# Patient Record
Sex: FEMALE | Race: BLACK OR AFRICAN AMERICAN | NOT HISPANIC OR LATINO
[De-identification: names, ages, dates, MRNs, and addresses within clinical notes are randomized per-mention and may not be internally consistent; named-entity substitution may affect disease eponyms.]

---

## 2017-01-05 ENCOUNTER — APPOINTMENT (OUTPATIENT)
Dept: BARIATRICS | Facility: CLINIC | Age: 47
End: 2017-01-05

## 2017-01-09 ENCOUNTER — APPOINTMENT (OUTPATIENT)
Dept: OTOLARYNGOLOGY | Facility: CLINIC | Age: 47
End: 2017-01-09

## 2017-01-30 ENCOUNTER — APPOINTMENT (OUTPATIENT)
Dept: OTOLARYNGOLOGY | Facility: CLINIC | Age: 47
End: 2017-01-30

## 2017-01-30 VITALS — DIASTOLIC BLOOD PRESSURE: 80 MMHG | HEART RATE: 78 BPM | OXYGEN SATURATION: 98 % | SYSTOLIC BLOOD PRESSURE: 122 MMHG

## 2017-01-30 DIAGNOSIS — G47.19 OTHER HYPERSOMNIA: ICD-10-CM

## 2017-02-13 ENCOUNTER — APPOINTMENT (OUTPATIENT)
Dept: PULMONOLOGY | Facility: CLINIC | Age: 47
End: 2017-02-13

## 2017-02-27 ENCOUNTER — APPOINTMENT (OUTPATIENT)
Dept: BARIATRICS | Facility: CLINIC | Age: 47
End: 2017-02-27

## 2017-02-27 VITALS — WEIGHT: 270 LBS | HEIGHT: 65 IN | BODY MASS INDEX: 44.98 KG/M2

## 2017-03-06 ENCOUNTER — APPOINTMENT (OUTPATIENT)
Dept: PULMONOLOGY | Facility: CLINIC | Age: 47
End: 2017-03-06

## 2017-03-24 ENCOUNTER — APPOINTMENT (OUTPATIENT)
Dept: PULMONOLOGY | Facility: CLINIC | Age: 47
End: 2017-03-24

## 2017-04-06 ENCOUNTER — APPOINTMENT (OUTPATIENT)
Dept: BARIATRICS | Facility: CLINIC | Age: 47
End: 2017-04-06

## 2017-04-06 VITALS
BODY MASS INDEX: 45.32 KG/M2 | OXYGEN SATURATION: 98 % | TEMPERATURE: 97.8 F | SYSTOLIC BLOOD PRESSURE: 124 MMHG | HEIGHT: 65 IN | WEIGHT: 272 LBS | HEART RATE: 80 BPM | DIASTOLIC BLOOD PRESSURE: 84 MMHG

## 2017-05-02 RX ORDER — SCOPALAMINE 1 MG/3D
1.5 PATCH, EXTENDED RELEASE TRANSDERMAL ONCE
Qty: 0 | Refills: 0 | Status: COMPLETED | OUTPATIENT
Start: 2017-05-08 | End: 2017-05-08

## 2017-05-05 VITALS
DIASTOLIC BLOOD PRESSURE: 63 MMHG | TEMPERATURE: 98 F | WEIGHT: 270.07 LBS | RESPIRATION RATE: 16 BRPM | SYSTOLIC BLOOD PRESSURE: 125 MMHG | HEIGHT: 65 IN | OXYGEN SATURATION: 99 % | HEART RATE: 72 BPM

## 2017-05-05 NOTE — PATIENT PROFILE ADULT. - PMH
Arthritis  psoariatic arthritis  Cerebral infarct    DM (diabetes mellitus)    HLP (hyperkeratosis lenticularis perstans)    HTN (hypertension)    TIA (transient ischemic attack)

## 2017-05-08 ENCOUNTER — APPOINTMENT (OUTPATIENT)
Dept: BARIATRICS | Facility: HOSPITAL | Age: 47
End: 2017-05-08

## 2017-05-08 ENCOUNTER — INPATIENT (INPATIENT)
Facility: HOSPITAL | Age: 47
LOS: 1 days | Discharge: HOME | DRG: 621 | End: 2017-05-10
Attending: SURGERY | Admitting: SURGERY
Payer: COMMERCIAL

## 2017-05-08 ENCOUNTER — RESULT REVIEW (OUTPATIENT)
Age: 47
End: 2017-05-08

## 2017-05-08 DIAGNOSIS — Z98.890 OTHER SPECIFIED POSTPROCEDURAL STATES: Chronic | ICD-10-CM

## 2017-05-08 DIAGNOSIS — Z90.710 ACQUIRED ABSENCE OF BOTH CERVIX AND UTERUS: Chronic | ICD-10-CM

## 2017-05-08 LAB
HCT VFR BLD CALC: 35 % — SIGNIFICANT CHANGE UP (ref 34.5–45)
HGB BLD-MCNC: 11.9 G/DL — SIGNIFICANT CHANGE UP (ref 11.5–15.5)
MCHC RBC-ENTMCNC: 30.9 PG — SIGNIFICANT CHANGE UP (ref 27–34)
MCHC RBC-ENTMCNC: 34 G/DL — SIGNIFICANT CHANGE UP (ref 32–36)
MCV RBC AUTO: 90.9 FL — SIGNIFICANT CHANGE UP (ref 80–100)
PLATELET # BLD AUTO: 401 K/UL — HIGH (ref 150–400)
RBC # BLD: 3.85 M/UL — SIGNIFICANT CHANGE UP (ref 3.8–5.2)
RBC # FLD: 13.6 % — SIGNIFICANT CHANGE UP (ref 10.3–16.9)
WBC # BLD: 12.5 K/UL — HIGH (ref 3.8–10.5)
WBC # FLD AUTO: 12.5 K/UL — HIGH (ref 3.8–10.5)

## 2017-05-08 PROCEDURE — 43775 LAP SLEEVE GASTRECTOMY: CPT | Mod: GC

## 2017-05-08 PROCEDURE — 43332 TRANSAB ESOPH HIAT HERN RPR: CPT | Mod: GC

## 2017-05-08 RX ORDER — PANTOPRAZOLE SODIUM 20 MG/1
40 TABLET, DELAYED RELEASE ORAL DAILY
Qty: 0 | Refills: 0 | Status: DISCONTINUED | OUTPATIENT
Start: 2017-05-08 | End: 2017-05-09

## 2017-05-08 RX ORDER — SODIUM CHLORIDE 9 MG/ML
1000 INJECTION, SOLUTION INTRAVENOUS
Qty: 0 | Refills: 0 | Status: DISCONTINUED | OUTPATIENT
Start: 2017-05-08 | End: 2017-05-10

## 2017-05-08 RX ORDER — HYDROMORPHONE HYDROCHLORIDE 2 MG/ML
0.5 INJECTION INTRAMUSCULAR; INTRAVENOUS; SUBCUTANEOUS
Qty: 0 | Refills: 0 | Status: DISCONTINUED | OUTPATIENT
Start: 2017-05-08 | End: 2017-05-08

## 2017-05-08 RX ORDER — LOSARTAN POTASSIUM 100 MG/1
1 TABLET, FILM COATED ORAL
Qty: 0 | Refills: 0 | COMMUNITY

## 2017-05-08 RX ORDER — FOLIC ACID 0.8 MG
1 TABLET ORAL
Qty: 0 | Refills: 0 | COMMUNITY

## 2017-05-08 RX ORDER — INDOMETHACIN 50 MG
1 CAPSULE ORAL
Qty: 0 | Refills: 0 | COMMUNITY

## 2017-05-08 RX ORDER — DEXTROSE 50 % IN WATER 50 %
25 SYRINGE (ML) INTRAVENOUS ONCE
Qty: 0 | Refills: 0 | Status: DISCONTINUED | OUTPATIENT
Start: 2017-05-08 | End: 2017-05-10

## 2017-05-08 RX ORDER — SIMVASTATIN 20 MG/1
1 TABLET, FILM COATED ORAL
Qty: 0 | Refills: 0 | COMMUNITY

## 2017-05-08 RX ORDER — METHOTREXATE 2.5 MG/1
1 TABLET ORAL
Qty: 0 | Refills: 0 | COMMUNITY

## 2017-05-08 RX ORDER — SIMETHICONE 80 MG/1
80 TABLET, CHEWABLE ORAL EVERY 4 HOURS
Qty: 0 | Refills: 0 | Status: DISCONTINUED | OUTPATIENT
Start: 2017-05-08 | End: 2017-05-10

## 2017-05-08 RX ORDER — GLUCAGON INJECTION, SOLUTION 0.5 MG/.1ML
1 INJECTION, SOLUTION SUBCUTANEOUS ONCE
Qty: 0 | Refills: 0 | Status: DISCONTINUED | OUTPATIENT
Start: 2017-05-08 | End: 2017-05-10

## 2017-05-08 RX ORDER — ENOXAPARIN SODIUM 100 MG/ML
40 INJECTION SUBCUTANEOUS ONCE
Qty: 0 | Refills: 0 | Status: COMPLETED | OUTPATIENT
Start: 2017-05-08 | End: 2017-05-08

## 2017-05-08 RX ORDER — DEXTROSE 50 % IN WATER 50 %
12.5 SYRINGE (ML) INTRAVENOUS ONCE
Qty: 0 | Refills: 0 | Status: DISCONTINUED | OUTPATIENT
Start: 2017-05-08 | End: 2017-05-10

## 2017-05-08 RX ORDER — HYDROMORPHONE HYDROCHLORIDE 2 MG/ML
1 INJECTION INTRAMUSCULAR; INTRAVENOUS; SUBCUTANEOUS EVERY 4 HOURS
Qty: 0 | Refills: 0 | Status: DISCONTINUED | OUTPATIENT
Start: 2017-05-08 | End: 2017-05-09

## 2017-05-08 RX ORDER — HYDROMORPHONE HYDROCHLORIDE 2 MG/ML
0.5 INJECTION INTRAMUSCULAR; INTRAVENOUS; SUBCUTANEOUS EVERY 4 HOURS
Qty: 0 | Refills: 0 | Status: DISCONTINUED | OUTPATIENT
Start: 2017-05-08 | End: 2017-05-09

## 2017-05-08 RX ORDER — HYDROMORPHONE HYDROCHLORIDE 2 MG/ML
0.5 INJECTION INTRAMUSCULAR; INTRAVENOUS; SUBCUTANEOUS
Qty: 0 | Refills: 0 | Status: DISCONTINUED | OUTPATIENT
Start: 2017-05-08 | End: 2017-05-09

## 2017-05-08 RX ORDER — HYDROMORPHONE HYDROCHLORIDE 2 MG/ML
0.5 INJECTION INTRAMUSCULAR; INTRAVENOUS; SUBCUTANEOUS EVERY 4 HOURS
Qty: 0 | Refills: 0 | Status: DISCONTINUED | OUTPATIENT
Start: 2017-05-08 | End: 2017-05-08

## 2017-05-08 RX ORDER — ONDANSETRON 8 MG/1
4 TABLET, FILM COATED ORAL EVERY 6 HOURS
Qty: 0 | Refills: 0 | Status: DISCONTINUED | OUTPATIENT
Start: 2017-05-08 | End: 2017-05-10

## 2017-05-08 RX ORDER — PANTOPRAZOLE SODIUM 20 MG/1
40 TABLET, DELAYED RELEASE ORAL DAILY
Qty: 0 | Refills: 0 | Status: DISCONTINUED | OUTPATIENT
Start: 2017-05-08 | End: 2017-05-08

## 2017-05-08 RX ORDER — HYDROMORPHONE HYDROCHLORIDE 2 MG/ML
1 INJECTION INTRAMUSCULAR; INTRAVENOUS; SUBCUTANEOUS EVERY 4 HOURS
Qty: 0 | Refills: 0 | Status: DISCONTINUED | OUTPATIENT
Start: 2017-05-08 | End: 2017-05-08

## 2017-05-08 RX ORDER — INSULIN LISPRO 100/ML
VIAL (ML) SUBCUTANEOUS
Qty: 0 | Refills: 0 | Status: DISCONTINUED | OUTPATIENT
Start: 2017-05-08 | End: 2017-05-10

## 2017-05-08 RX ORDER — METOCLOPRAMIDE HCL 10 MG
10 TABLET ORAL EVERY 8 HOURS
Qty: 0 | Refills: 0 | Status: DISCONTINUED | OUTPATIENT
Start: 2017-05-08 | End: 2017-05-10

## 2017-05-08 RX ORDER — SCOPALAMINE 1 MG/3D
1.5 PATCH, EXTENDED RELEASE TRANSDERMAL ONCE
Qty: 0 | Refills: 0 | Status: COMPLETED | OUTPATIENT
Start: 2017-05-08 | End: 2017-05-08

## 2017-05-08 RX ORDER — DEXTROSE 50 % IN WATER 50 %
1 SYRINGE (ML) INTRAVENOUS ONCE
Qty: 0 | Refills: 0 | Status: DISCONTINUED | OUTPATIENT
Start: 2017-05-08 | End: 2017-05-10

## 2017-05-08 RX ORDER — ASPIRIN/CALCIUM CARB/MAGNESIUM 324 MG
1 TABLET ORAL
Qty: 0 | Refills: 0 | COMMUNITY

## 2017-05-08 RX ADMIN — HYDROMORPHONE HYDROCHLORIDE 1 MILLIGRAM(S): 2 INJECTION INTRAMUSCULAR; INTRAVENOUS; SUBCUTANEOUS at 19:55

## 2017-05-08 RX ADMIN — ENOXAPARIN SODIUM 40 MILLIGRAM(S): 100 INJECTION SUBCUTANEOUS at 07:31

## 2017-05-08 RX ADMIN — SIMETHICONE 80 MILLIGRAM(S): 80 TABLET, CHEWABLE ORAL at 23:31

## 2017-05-08 RX ADMIN — HYDROMORPHONE HYDROCHLORIDE 0.5 MILLIGRAM(S): 2 INJECTION INTRAMUSCULAR; INTRAVENOUS; SUBCUTANEOUS at 14:04

## 2017-05-08 RX ADMIN — Medication: at 11:10

## 2017-05-08 RX ADMIN — HYDROMORPHONE HYDROCHLORIDE 0.5 MILLIGRAM(S): 2 INJECTION INTRAMUSCULAR; INTRAVENOUS; SUBCUTANEOUS at 23:32

## 2017-05-08 RX ADMIN — HYDROMORPHONE HYDROCHLORIDE 0.5 MILLIGRAM(S): 2 INJECTION INTRAMUSCULAR; INTRAVENOUS; SUBCUTANEOUS at 23:57

## 2017-05-08 RX ADMIN — HYDROMORPHONE HYDROCHLORIDE 0.5 MILLIGRAM(S): 2 INJECTION INTRAMUSCULAR; INTRAVENOUS; SUBCUTANEOUS at 11:40

## 2017-05-08 RX ADMIN — HYDROMORPHONE HYDROCHLORIDE 0.5 MILLIGRAM(S): 2 INJECTION INTRAMUSCULAR; INTRAVENOUS; SUBCUTANEOUS at 11:06

## 2017-05-08 RX ADMIN — HYDROMORPHONE HYDROCHLORIDE 0.5 MILLIGRAM(S): 2 INJECTION INTRAMUSCULAR; INTRAVENOUS; SUBCUTANEOUS at 12:11

## 2017-05-08 RX ADMIN — HYDROMORPHONE HYDROCHLORIDE 1 MILLIGRAM(S): 2 INJECTION INTRAMUSCULAR; INTRAVENOUS; SUBCUTANEOUS at 19:33

## 2017-05-08 RX ADMIN — SCOPALAMINE 1.5 MILLIGRAM(S): 1 PATCH, EXTENDED RELEASE TRANSDERMAL at 07:31

## 2017-05-08 RX ADMIN — PANTOPRAZOLE SODIUM 40 MILLIGRAM(S): 20 TABLET, DELAYED RELEASE ORAL at 13:43

## 2017-05-08 RX ADMIN — ONDANSETRON 4 MILLIGRAM(S): 8 TABLET, FILM COATED ORAL at 19:32

## 2017-05-08 NOTE — H&P ADULT - FAMILY HISTORY
<<-----Click on this checkbox to enter Family History Family history of multiple sclerosis     Father  Still living? Unknown  Family history of diabetes mellitus, Age at diagnosis: Age Unknown  Family history of hypertension, Age at diagnosis: Age Unknown     Mother  Still living? Unknown  Family history of diabetes mellitus, Age at diagnosis: Age Unknown  Family history of hypertension, Age at diagnosis: Age Unknown

## 2017-05-08 NOTE — CHART NOTE - NSCHARTNOTEFT_GEN_A_CORE
Procedure: lap sleeve gastrectomy + hiatal hernia repair   Surgeon: Dr Bender    S: Pt has no complaints. Denies CP, SOB, CORTÉS, calf tenderness. Pain controlled with medication.    O:  T(C): 36.7, Max: 36.7 (05-08 @ 12:00)  T(F): 98, Max: 98 (05-08 @ 12:00)  HR: 61 (54 - 68)  BP: 161/72 (120/67 - 164/77)  RR: 16 (14 - 18)  SpO2: 100% (96% - 100%)  Wt(kg): --            Gen: NAD, resting comfortably in bed  C/V: NSR  Pulm: Nonlabored breathing, no respiratory distress  Abd: obese, soft, appropriately tender.  Incision: lap sites CDI  Extrem: WWP, no calf edema, SCDs in place      A/P: 46yFemale s/p above procedure  Diet: BCLD  IVF: 150  Pain/nausea control  DVT ppx  CBC 4pm

## 2017-05-08 NOTE — PACU DISCHARGE NOTE - COMMENTS
pt aao x3.  VSS.  lap sites with dermabond.  report given to YUNIER dinh on 9 uris.  pt to go to [id number] via bed with nursing attendants

## 2017-05-08 NOTE — H&P ADULT - HISTORY OF PRESENT ILLNESS
46F w/ morbid obesity (BMI 45.2), HTN, HLD, HTG, T2DM, arthritis, lower back pain, and h/o stroke who presents for LSG. Unable to lose weight by other means and continues gaining weight.

## 2017-05-08 NOTE — BRIEF OPERATIVE NOTE - PROCEDURE
Laparoscopic sleeve gastrectomy with laparoscopic repair of hiatal hernia  05/08/2017    Active  BBORDEN

## 2017-05-08 NOTE — BRIEF OPERATIVE NOTE - OPERATION/FINDINGS
Stomach stapled along [place]ie using Endopath [place] staple loads- 1 gold, 2 green, 3 blue. Small hiatal hernia closed. Umbilical port site closed via endoclose. Hemostasis achieved at end of case. Port sites closed primarily.

## 2017-05-08 NOTE — H&P ADULT - NSHPPHYSICALEXAM_GEN_ALL_CORE
Pre-Op Clinic Visit Physical Exam:   Constitutional: well developed, well nourished, in no acute distress and obese, well nourished, in no acute distress.   Eyes: PERRL, EOMI, no conjunctival infection, anicteric.   ENT: pharynx is unremarkable, moist mucus membrane, no oral lesions.   Neck: supple without JVD, no thyromegaly or masses appreciated.   Pulmonary: clear to auscultation bilaterally, no dullness, no wheezing.   Cardiac: RRR, normal S1S2, no murmurs, rubs, gallops.   Vascular: no JVD, no calf tenderness, venous stasis changes, varices.   Abdomen: normoactive bowel sounds, soft and nontender, no hepatosplenomegaly or masses appreciated. Incisions healing appropriately without erythema or drainage.   Lymphatic: no peripheral adenopathy appreciated.   Musculoskeletal: full range of motion and no deformities appreciated.   Skin: normal appearance, no rash, nodules, vesicles, ulcers, erythema.   Neurology: grossly intact.   Psychiatric: affect appropriate.

## 2017-05-08 NOTE — H&P ADULT - ASSESSMENT
46F w/ morbid obesity (BMI 45.2), HTN, HLD, HTG, T2DM, arthritis, lower back pain, and h/o stroke who presents for LSG.     -BCLD; IVF until tolerating adequate PO  -Pain/nausea control  -Protonix  -CBC 6hrs post-op  -SQH, SCDs, OOB/A, IS  -AM labs    -UGI series in AM   -Nutrition consult in AM

## 2017-05-09 PROBLEM — I63.9 CEREBRAL INFARCTION, UNSPECIFIED: Chronic | Status: ACTIVE | Noted: 2017-05-05

## 2017-05-09 PROBLEM — I10 ESSENTIAL (PRIMARY) HYPERTENSION: Chronic | Status: ACTIVE | Noted: 2017-05-05

## 2017-05-09 PROBLEM — G45.9 TRANSIENT CEREBRAL ISCHEMIC ATTACK, UNSPECIFIED: Chronic | Status: ACTIVE | Noted: 2017-05-05

## 2017-05-09 PROBLEM — E11.9 TYPE 2 DIABETES MELLITUS WITHOUT COMPLICATIONS: Chronic | Status: ACTIVE | Noted: 2017-05-05

## 2017-05-09 PROBLEM — M19.90 UNSPECIFIED OSTEOARTHRITIS, UNSPECIFIED SITE: Chronic | Status: ACTIVE | Noted: 2017-05-05

## 2017-05-09 PROBLEM — L85.9 EPIDERMAL THICKENING, UNSPECIFIED: Chronic | Status: ACTIVE | Noted: 2017-05-05

## 2017-05-09 LAB
ANION GAP SERPL CALC-SCNC: 14 MMOL/L — SIGNIFICANT CHANGE UP (ref 9–16)
BUN SERPL-MCNC: 12 MG/DL — SIGNIFICANT CHANGE UP (ref 7–23)
CALCIUM SERPL-MCNC: 9.1 MG/DL — SIGNIFICANT CHANGE UP (ref 8.5–10.5)
CHLORIDE SERPL-SCNC: 100 MMOL/L — SIGNIFICANT CHANGE UP (ref 96–108)
CO2 SERPL-SCNC: 22 MMOL/L — SIGNIFICANT CHANGE UP (ref 22–31)
CREAT SERPL-MCNC: 0.88 MG/DL — SIGNIFICANT CHANGE UP (ref 0.5–1.3)
GLUCOSE SERPL-MCNC: 144 MG/DL — HIGH (ref 70–99)
HBA1C BLD-MCNC: 8.6 % — HIGH (ref 4.8–5.6)
HCT VFR BLD CALC: 31.9 % — LOW (ref 34.5–45)
HGB BLD-MCNC: 10.7 G/DL — LOW (ref 11.5–15.5)
MAGNESIUM SERPL-MCNC: 1.6 MG/DL — SIGNIFICANT CHANGE UP (ref 1.6–2.4)
MCHC RBC-ENTMCNC: 30.6 PG — SIGNIFICANT CHANGE UP (ref 27–34)
MCHC RBC-ENTMCNC: 33.5 G/DL — SIGNIFICANT CHANGE UP (ref 32–36)
MCV RBC AUTO: 91.1 FL — SIGNIFICANT CHANGE UP (ref 80–100)
PHOSPHATE SERPL-MCNC: 2.5 MG/DL — SIGNIFICANT CHANGE UP (ref 2.5–4.5)
PLATELET # BLD AUTO: 398 K/UL — SIGNIFICANT CHANGE UP (ref 150–400)
POTASSIUM SERPL-MCNC: 3.8 MMOL/L — SIGNIFICANT CHANGE UP (ref 3.5–5.3)
POTASSIUM SERPL-SCNC: 3.8 MMOL/L — SIGNIFICANT CHANGE UP (ref 3.5–5.3)
RBC # BLD: 3.5 M/UL — LOW (ref 3.8–5.2)
RBC # FLD: 14.3 % — SIGNIFICANT CHANGE UP (ref 10.3–16.9)
SODIUM SERPL-SCNC: 136 MMOL/L — SIGNIFICANT CHANGE UP (ref 135–145)
WBC # BLD: 9.4 K/UL — SIGNIFICANT CHANGE UP (ref 3.8–10.5)
WBC # FLD AUTO: 9.4 K/UL — SIGNIFICANT CHANGE UP (ref 3.8–10.5)

## 2017-05-09 PROCEDURE — 74241: CPT | Mod: 26

## 2017-05-09 RX ORDER — PANTOPRAZOLE SODIUM 20 MG/1
40 TABLET, DELAYED RELEASE ORAL
Qty: 0 | Refills: 0 | Status: DISCONTINUED | OUTPATIENT
Start: 2017-05-09 | End: 2017-05-10

## 2017-05-09 RX ORDER — SIMVASTATIN 20 MG/1
20 TABLET, FILM COATED ORAL AT BEDTIME
Qty: 0 | Refills: 0 | Status: DISCONTINUED | OUTPATIENT
Start: 2017-05-09 | End: 2017-05-10

## 2017-05-09 RX ORDER — POTASSIUM CHLORIDE 20 MEQ
20 PACKET (EA) ORAL ONCE
Qty: 0 | Refills: 0 | Status: COMPLETED | OUTPATIENT
Start: 2017-05-09 | End: 2017-05-09

## 2017-05-09 RX ORDER — MAGNESIUM SULFATE 500 MG/ML
2 VIAL (ML) INJECTION ONCE
Qty: 0 | Refills: 0 | Status: COMPLETED | OUTPATIENT
Start: 2017-05-09 | End: 2017-05-09

## 2017-05-09 RX ORDER — HEPARIN SODIUM 5000 [USP'U]/ML
7500 INJECTION INTRAVENOUS; SUBCUTANEOUS EVERY 8 HOURS
Qty: 0 | Refills: 0 | Status: DISCONTINUED | OUTPATIENT
Start: 2017-05-09 | End: 2017-05-10

## 2017-05-09 RX ORDER — LOSARTAN POTASSIUM 100 MG/1
100 TABLET, FILM COATED ORAL DAILY
Qty: 0 | Refills: 0 | Status: DISCONTINUED | OUTPATIENT
Start: 2017-05-09 | End: 2017-05-10

## 2017-05-09 RX ADMIN — SIMETHICONE 80 MILLIGRAM(S): 80 TABLET, CHEWABLE ORAL at 05:17

## 2017-05-09 RX ADMIN — HEPARIN SODIUM 7500 UNIT(S): 5000 INJECTION INTRAVENOUS; SUBCUTANEOUS at 21:16

## 2017-05-09 RX ADMIN — Medication 20 MILLIEQUIVALENT(S): at 13:12

## 2017-05-09 RX ADMIN — HEPARIN SODIUM 7500 UNIT(S): 5000 INJECTION INTRAVENOUS; SUBCUTANEOUS at 05:22

## 2017-05-09 RX ADMIN — HEPARIN SODIUM 7500 UNIT(S): 5000 INJECTION INTRAVENOUS; SUBCUTANEOUS at 13:12

## 2017-05-09 RX ADMIN — Medication 2: at 13:12

## 2017-05-09 RX ADMIN — ONDANSETRON 4 MILLIGRAM(S): 8 TABLET, FILM COATED ORAL at 06:05

## 2017-05-09 RX ADMIN — Medication 50 GRAM(S): at 13:21

## 2017-05-09 RX ADMIN — Medication 1: at 08:10

## 2017-05-09 RX ADMIN — SIMETHICONE 80 MILLIGRAM(S): 80 TABLET, CHEWABLE ORAL at 18:56

## 2017-05-09 RX ADMIN — HYDROMORPHONE HYDROCHLORIDE 0.5 MILLIGRAM(S): 2 INJECTION INTRAMUSCULAR; INTRAVENOUS; SUBCUTANEOUS at 08:08

## 2017-05-09 RX ADMIN — SODIUM CHLORIDE 200 MILLILITER(S): 9 INJECTION, SOLUTION INTRAVENOUS at 08:09

## 2017-05-09 RX ADMIN — SIMVASTATIN 20 MILLIGRAM(S): 20 TABLET, FILM COATED ORAL at 21:15

## 2017-05-09 RX ADMIN — SIMETHICONE 80 MILLIGRAM(S): 80 TABLET, CHEWABLE ORAL at 13:21

## 2017-05-09 NOTE — PROGRESS NOTE ADULT - SUBJECTIVE AND OBJECTIVE BOX
O/N: Afebrile , VSS, passed TOV, Post op H/H 11.9/35  5/8: POC WNL    STATUS POST:  Laparoscopic sleeve gastrectomy and hiatal hernia repair   POST OPERATIVE DAY #: 1    SUBJECTIVE: Patient complaining of incisional and gas pain  Flatus: [] YES [X] NO             Bowel Movement: [ ] YES [X ] NO  Pain (0-10):    5        Pain Control Adequate: [X ] YES [ ] NO  Nausea: [X ] YES [ ] NO            Vomiting: [ ] YES [X ] NO  Diarrhea: [ ] YES [X ] NO         Constipation: [ ] YES [X ] NO     Chest Pain: [ ] YES [X ] NO    SOB:  [ ] YES [X ] NO    heparin  Injectable 7500Unit(s) SubCutaneous every 8 hours      Vital Signs Last 24 Hrs  T(C): 37.6, Max: 37.6 (05-09 @ 00:42)  T(F): 99.7, Max: 99.7 (05-09 @ 05:38)  HR: 74 (53 - 95)  BP: 120/76 (120/67 - 184/84)  BP(mean): --  RR: 17 (14 - 18)  SpO2: 95% (94% - 100%)  I&O's Detail    I & Os for current day (as of 09 May 2017 06:44)  =============================================  IN:    lactated ringers.: 3400 ml    Total IN: 3400 ml  ---------------------------------------------  OUT:    Voided: 1450 ml    Total OUT: 1450 ml  ---------------------------------------------  Total NET: 1950 ml      General: NAD, resting comfortably in bed  C/V: NSR  Pulm: Nonlabored breathing, no respiratory distress  Abd: soft, non distended , TTP around incision site , incision clean dry and intact   Extrem: WWP, no edema, SCDs in place        LABS:                        11.9   12.5  )-----------( 401      ( 08 May 2017 20:20 )             35.0                 RADIOLOGY & ADDITIONAL STUDIES:

## 2017-05-09 NOTE — PROGRESS NOTE ADULT - ASSESSMENT
46F s/p LSG & hiatal hernia repair    -BCLD  -Pain/nausea control  -Protonix  -CBC 6hrs post-op  -SQH, SCDs, OOB/A, IS  -AM labs    -UGI series in AM   -Nutrition consult in AM

## 2017-05-09 NOTE — DIETITIAN INITIAL EVALUATION ADULT. - OTHER INFO
Pt is s/p sleeve gastrectomy and hernia repair.  Pt endorses good tolerance of bariatric clears thus far.  Pt denies GI distress; pain is being managed.  NKFA.  Skin: surgical incision.

## 2017-05-10 ENCOUNTER — TRANSCRIPTION ENCOUNTER (OUTPATIENT)
Age: 47
End: 2017-05-10

## 2017-05-10 VITALS
OXYGEN SATURATION: 96 % | RESPIRATION RATE: 16 BRPM | DIASTOLIC BLOOD PRESSURE: 77 MMHG | TEMPERATURE: 98 F | SYSTOLIC BLOOD PRESSURE: 109 MMHG | HEART RATE: 80 BPM

## 2017-05-10 LAB
ANION GAP SERPL CALC-SCNC: 10 MMOL/L — SIGNIFICANT CHANGE UP (ref 9–16)
BUN SERPL-MCNC: 9 MG/DL — SIGNIFICANT CHANGE UP (ref 7–23)
CALCIUM SERPL-MCNC: 7.5 MG/DL — LOW (ref 8.5–10.5)
CHLORIDE SERPL-SCNC: 103 MMOL/L — SIGNIFICANT CHANGE UP (ref 96–108)
CO2 SERPL-SCNC: 25 MMOL/L — SIGNIFICANT CHANGE UP (ref 22–31)
CREAT SERPL-MCNC: 0.78 MG/DL — SIGNIFICANT CHANGE UP (ref 0.5–1.3)
GLUCOSE SERPL-MCNC: 155 MG/DL — HIGH (ref 70–99)
HCT VFR BLD CALC: 30.1 % — LOW (ref 34.5–45)
HGB BLD-MCNC: 9.7 G/DL — LOW (ref 11.5–15.5)
MAGNESIUM SERPL-MCNC: 1.7 MG/DL — SIGNIFICANT CHANGE UP (ref 1.6–2.4)
MCHC RBC-ENTMCNC: 29.6 PG — SIGNIFICANT CHANGE UP (ref 27–34)
MCHC RBC-ENTMCNC: 32.2 G/DL — SIGNIFICANT CHANGE UP (ref 32–36)
MCV RBC AUTO: 91.8 FL — SIGNIFICANT CHANGE UP (ref 80–100)
PHOSPHATE SERPL-MCNC: 2.9 MG/DL — SIGNIFICANT CHANGE UP (ref 2.5–4.5)
PLATELET # BLD AUTO: 352 K/UL — SIGNIFICANT CHANGE UP (ref 150–400)
POTASSIUM SERPL-MCNC: 3.6 MMOL/L — SIGNIFICANT CHANGE UP (ref 3.5–5.3)
POTASSIUM SERPL-SCNC: 3.6 MMOL/L — SIGNIFICANT CHANGE UP (ref 3.5–5.3)
RBC # BLD: 3.28 M/UL — LOW (ref 3.8–5.2)
RBC # FLD: 14.3 % — SIGNIFICANT CHANGE UP (ref 10.3–16.9)
SODIUM SERPL-SCNC: 138 MMOL/L — SIGNIFICANT CHANGE UP (ref 135–145)
WBC # BLD: 9.3 K/UL — SIGNIFICANT CHANGE UP (ref 3.8–10.5)
WBC # FLD AUTO: 9.3 K/UL — SIGNIFICANT CHANGE UP (ref 3.8–10.5)

## 2017-05-10 RX ORDER — CHLORTHALIDONE 50 MG
1 TABLET ORAL
Qty: 0 | Refills: 0 | COMMUNITY

## 2017-05-10 RX ORDER — LINAGLIPTIN 5 MG/1
1 TABLET, FILM COATED ORAL
Qty: 0 | Refills: 0 | COMMUNITY

## 2017-05-10 RX ORDER — POTASSIUM CHLORIDE 20 MEQ
40 PACKET (EA) ORAL ONCE
Qty: 0 | Refills: 0 | Status: COMPLETED | OUTPATIENT
Start: 2017-05-10 | End: 2017-05-10

## 2017-05-10 RX ORDER — METFORMIN HYDROCHLORIDE 850 MG/1
1 TABLET ORAL
Qty: 0 | Refills: 0 | COMMUNITY

## 2017-05-10 RX ORDER — OMEPRAZOLE 10 MG/1
1 CAPSULE, DELAYED RELEASE ORAL
Qty: 30 | Refills: 0 | OUTPATIENT
Start: 2017-05-10 | End: 2017-06-09

## 2017-05-10 RX ORDER — MAGNESIUM SULFATE 500 MG/ML
1 VIAL (ML) INJECTION ONCE
Qty: 0 | Refills: 0 | Status: COMPLETED | OUTPATIENT
Start: 2017-05-10 | End: 2017-05-10

## 2017-05-10 RX ADMIN — Medication 1: at 07:57

## 2017-05-10 RX ADMIN — Medication 40 MILLIEQUIVALENT(S): at 12:48

## 2017-05-10 RX ADMIN — HEPARIN SODIUM 7500 UNIT(S): 5000 INJECTION INTRAVENOUS; SUBCUTANEOUS at 06:03

## 2017-05-10 RX ADMIN — LOSARTAN POTASSIUM 100 MILLIGRAM(S): 100 TABLET, FILM COATED ORAL at 06:03

## 2017-05-10 RX ADMIN — PANTOPRAZOLE SODIUM 40 MILLIGRAM(S): 20 TABLET, DELAYED RELEASE ORAL at 06:03

## 2017-05-10 NOTE — DISCHARGE NOTE ADULT - CARE PLAN
Principal Discharge DX:	Morbid obesity with BMI of 40.0-44.9, adult  Goal:	Recovery  Instructions for follow-up, activity and diet:	Follow up with  in 1 week. Call the office at  to schedule your appointment.  You may shower; soap and water over incision sites. Do not scrub. Pat dry when done. No tub bathing or swimming until cleared. Keep incision sites out of the sun as scars will darken. No heavy lifting (>10lbs) or strenuous exercise. Diet: Bariatric Full Fluids. 60 grams protein daily.  64 fluid ounces water daily. Drink small sips throughout the day. Continue diet as outlined by paperwork received as a pre-operative patient. You should be urinating at least 3-4x per day. Call the office if you experience increasing abdominal pain, nausea, vomiting, or temperature >100.4F.  NO ASPIRIN OR NSAIDs until approved by Dr. Bender. Avoid alcoholic beverages until cleared by Dr. Bender. Principal Discharge DX:	Morbid obesity with BMI of 40.0-44.9, adult  Goal:	Recovery  Instructions for follow-up, activity and diet:	Follow up with  in 1 week. Call the office at  to schedule your appointment.  You may shower; soap and water over incision sites. Do not scrub. Pat dry when done. No tub bathing or swimming until cleared. Keep incision sites out of the sun as scars will darken. No heavy lifting (>10lbs) or strenuous exercise. Diet: Bariatric Full Fluids. 60 grams protein daily.  64 fluid ounces water daily. Drink small sips throughout the day. Continue diet as outlined by paperwork received as a pre-operative patient. You should be urinating at least 3-4x per day. Call the office if you experience increasing abdominal pain, nausea, vomiting, or temperature >100.4F.  NO  NSAIDs until approved by Dr. Bender. Avoid alcoholic beverages until cleared by Dr. Bender.

## 2017-05-10 NOTE — DISCHARGE NOTE ADULT - HOSPITAL COURSE
45 y/o female who is obese here for elective surgery. Patient s/p laparoscopic sleeve gastrectomy.  Post-operatively, the pt's UGI was wnl. At time of discharge, pt was tolerating a bariatric clear liquid diet, and pt's pain was controlled. Plan is to follow up with Dr. Bender in the office.

## 2017-05-10 NOTE — PROGRESS NOTE ADULT - SUBJECTIVE AND OBJECTIVE BOX
O/N: afebrile, VSS. JONATHAN  5/9: UGI WNL, tolerating BCLD, pain controlled. Started home meds    STATUS POST: laparoscopic sleeve gastrectomy  and hiatal hernia repair     POST OP DAY #: 2 O/N: afebrile, VSS. JONATHAN  5/9: UGI WNL, tolerating BCLD, pain controlled. Started home meds    STATUS POST: laparoscopic sleeve gastrectomy  and hiatal hernia repair     POST OP DAY #: 2    SUBJECTIVE Patient denies any compliants   Flatus: [] YES [X] NO             Bowel Movement: [ ] YES [ X] NO  Pain (0-10):            Pain Control Adequate: [X ] YES [ ] NO  Nausea: [ ] YES [ X] NO            Vomiting: [ ] YES [X ] NO  Diarrhea: [ ] YES [X ] NO         Constipation: [ ] YES [X ] NO     Chest Pain: [ ] YES [ X] NO    SOB:  [ ] YES [X ] NO    heparin  Injectable 7500Unit(s) SubCutaneous every 8 hours  losartan 100milliGRAM(s) Oral daily      Vital Signs Last 24 Hrs  T(C): 37, Max: 37.5 (05-09 @ 13:04)  T(F): 98.6, Max: 99.5 (05-09 @ 13:04)  HR: 72 (69 - 75)  BP: 138/87 (118/70 - 156/79)  BP(mean): --  RR: 17 (16 - 18)  SpO2: 98% (96% - 99%)  I&O's Detail    I & Os for current day (as of 10 May 2017 07:52)  =============================================  IN:    lactated ringers.: 3150 ml    Oral Fluid: 840 ml    Total IN: 3990 ml  ---------------------------------------------  OUT:    Voided: 2850 ml    Total OUT: 2850 ml  ---------------------------------------------  Total NET: 1140 ml      General: NAD, resting comfortably in bed  C/V: NSR  Pulm: Nonlabored breathing, no respiratory distress  Abd: soft, non distended, TTP around incision site , incision clean dry and intact   Extrem: WWP, no edema, SCDs in place        LABS:                        9.7    9.3   )-----------( 352      ( 10 May 2017 05:49 )             30.1     05-10    138  |  103  |  9   ----------------------------<  155<H>  3.6   |  25  |  0.78    Ca    7.5<L>      10 May 2017 05:47  Phos  2.9     05-10  Mg     1.7     05-10            RADIOLOGY & ADDITIONAL STUDIES:

## 2017-05-10 NOTE — DISCHARGE NOTE ADULT - PATIENT PORTAL LINK FT
“You can access the [employer] Patient Portal, offered by [facility], by registering with the following website: [email or web address]”

## 2017-05-10 NOTE — DISCHARGE NOTE ADULT - PLAN OF CARE
Recovery Follow up with  in 1 week. Call the office at  to schedule your appointment.  You may shower; soap and water over incision sites. Do not scrub. Pat dry when done. No tub bathing or swimming until cleared. Keep incision sites out of the sun as scars will darken. No heavy lifting (>10lbs) or strenuous exercise. Diet: Bariatric Full Fluids. 60 grams protein daily.  64 fluid ounces water daily. Drink small sips throughout the day. Continue diet as outlined by paperwork received as a pre-operative patient. You should be urinating at least 3-4x per day. Call the office if you experience increasing abdominal pain, nausea, vomiting, or temperature >100.4F.  NO ASPIRIN OR NSAIDs until approved by Dr. Bender. Avoid alcoholic beverages until cleared by Dr. Bender. Follow up with  in 1 week. Call the office at  to schedule your appointment.  You may shower; soap and water over incision sites. Do not scrub. Pat dry when done. No tub bathing or swimming until cleared. Keep incision sites out of the sun as scars will darken. No heavy lifting (>10lbs) or strenuous exercise. Diet: Bariatric Full Fluids. 60 grams protein daily.  64 fluid ounces water daily. Drink small sips throughout the day. Continue diet as outlined by paperwork received as a pre-operative patient. You should be urinating at least 3-4x per day. Call the office if you experience increasing abdominal pain, nausea, vomiting, or temperature >100.4F.  NO  NSAIDs until approved by Dr. Bender. Avoid alcoholic beverages until cleared by Dr. Bender.

## 2017-05-10 NOTE — DISCHARGE NOTE ADULT - MEDICATION SUMMARY - MEDICATIONS TO TAKE
I will START or STAY ON the medications listed below when I get home from the hospital:    aspirin 81 mg oral tablet  -- 1 tab(s) by mouth once a day  -- Indication: For Home medication    indomethacin 75 mg oral capsule, extended release  -- 1 cap(s) by mouth twice a day  -- Indication: For Home medication    Percocet 5/325 325 mg-5 mg oral tablet  -- 1 tab(s) by mouth every 6 hours, As Needed -for severe pain MDD:6  -- Caution federal law prohibits the transfer of this drug to any person other  than the person for whom it was prescribed.  May cause drowsiness.  Alcohol may intensify this effect.  Use care when operating dangerous machinery.  This prescription cannot be refilled.  This product contains acetaminophen.  Do not use  with any other product containing acetaminophen to prevent possible liver damage.  Using more of this medication than prescribed may cause serious breathing problems.    -- Indication: For Post operative pain control    losartan 100 mg oral tablet  -- 1 tab(s) by mouth once a day  -- Indication: For Home medication    simvastatin 20 mg oral tablet  -- 1 tab(s) by mouth once a day (at bedtime)  -- Indication: For Home medication    methotrexate 5 mg oral tablet  -- 1 tab(s) by mouth once a week  -- Indication: For Home medication    omeprazole 40 mg oral delayed release capsule  -- 1 cap(s) by mouth once a day MDD:1  -- It is very important that you take or use this exactly as directed.  Do not skip doses or discontinue unless directed by your doctor.  Obtain medical advice before taking any non-prescription drugs as some may affect the action of this medication.  Swallow whole.  Do not crush.    -- Indication: For Post operative acid reflux    folic acid 1 mg oral tablet  -- 1 tab(s) by mouth once a day  -- Indication: For Home medication

## 2017-05-10 NOTE — DISCHARGE NOTE ADULT - MEDICATION SUMMARY - MEDICATIONS TO CHANGE
I will SWITCH the dose or number of times a day I take the medications listed below when I get home from the hospital:    aspirin 81 mg oral tablet  -- 1 tab(s) by mouth once a day    simvastatin 20 mg oral tablet  -- 1 tab(s) by mouth once a day (at bedtime)    losartan 100 mg oral tablet  -- 1 tab(s) by mouth once a day    methotrexate 5 mg oral tablet  -- 1 tab(s) by mouth once a week    folic acid 1 mg oral tablet  -- 1 tab(s) by mouth once a day

## 2017-05-10 NOTE — DISCHARGE NOTE ADULT - CARE PROVIDER_API CALL
Wade Bender (MD), Surgery  [address]  [place], [place] [address]  Phone: [phone]  Fax: [phone]

## 2017-05-10 NOTE — PROGRESS NOTE ADULT - ASSESSMENT
46yFemale s/p above procedure    BCLD  IVF  DVT ppx  Pain, nausea control  IS  OOBA  Encourage PO intake

## 2017-05-10 NOTE — DISCHARGE NOTE ADULT - INSTRUCTIONS
follow progression of diet as per instruction of Dr. Bender, Call Dr. Bender if you have any questions or concerns

## 2017-05-10 NOTE — DISCHARGE NOTE ADULT - MEDICATION SUMMARY - MEDICATIONS TO STOP TAKING
I will STOP taking the medications listed below when I get home from the hospital:    metFORMIN 1000 mg oral tablet  -- 1 tab(s) by mouth 2 times a day    chlorthalidone 25 mg oral tablet  -- 1 tab(s) by mouth once a day    Tradjenta 5 mg oral tablet  -- 1 tab(s) by mouth once a day

## 2017-05-10 NOTE — DISCHARGE NOTE ADULT - ADDITIONAL INSTRUCTIONS
1) Please take 1 to 2 Percocet every 4 to 6 hours as needed for pain control.  2) Please take Omeprazole 1 capsule once a day.

## 2017-05-11 LAB — SURGICAL PATHOLOGY STUDY: SIGNIFICANT CHANGE UP

## 2017-05-12 DIAGNOSIS — Z90.710 ACQUIRED ABSENCE OF BOTH CERVIX AND UTERUS: ICD-10-CM

## 2017-05-12 DIAGNOSIS — L85.9 EPIDERMAL THICKENING, UNSPECIFIED: ICD-10-CM

## 2017-05-12 DIAGNOSIS — Z79.82 LONG TERM (CURRENT) USE OF ASPIRIN: ICD-10-CM

## 2017-05-12 DIAGNOSIS — E78.5 HYPERLIPIDEMIA, UNSPECIFIED: ICD-10-CM

## 2017-05-12 DIAGNOSIS — M19.90 UNSPECIFIED OSTEOARTHRITIS, UNSPECIFIED SITE: ICD-10-CM

## 2017-05-12 DIAGNOSIS — M54.5 LOW BACK PAIN: ICD-10-CM

## 2017-05-12 DIAGNOSIS — K44.9 DIAPHRAGMATIC HERNIA WITHOUT OBSTRUCTION OR GANGRENE: ICD-10-CM

## 2017-05-12 DIAGNOSIS — Z86.73 PERSONAL HISTORY OF TRANSIENT ISCHEMIC ATTACK (TIA), AND CEREBRAL INFARCTION WITHOUT RESIDUAL DEFICITS: ICD-10-CM

## 2017-05-12 DIAGNOSIS — Z82.49 FAMILY HISTORY OF ISCHEMIC HEART DISEASE AND OTHER DISEASES OF THE CIRCULATORY SYSTEM: ICD-10-CM

## 2017-05-12 DIAGNOSIS — E66.01 MORBID (SEVERE) OBESITY DUE TO EXCESS CALORIES: ICD-10-CM

## 2017-05-12 DIAGNOSIS — I10 ESSENTIAL (PRIMARY) HYPERTENSION: ICD-10-CM

## 2017-05-12 DIAGNOSIS — Z79.84 LONG TERM (CURRENT) USE OF ORAL HYPOGLYCEMIC DRUGS: ICD-10-CM

## 2017-05-12 DIAGNOSIS — E78.1 PURE HYPERGLYCERIDEMIA: ICD-10-CM

## 2017-05-12 DIAGNOSIS — E11.9 TYPE 2 DIABETES MELLITUS WITHOUT COMPLICATIONS: ICD-10-CM

## 2017-05-12 DIAGNOSIS — Z83.3 FAMILY HISTORY OF DIABETES MELLITUS: ICD-10-CM

## 2017-05-12 DIAGNOSIS — Z88.8 ALLERGY STATUS TO OTHER DRUGS, MEDICAMENTS AND BIOLOGICAL SUBSTANCES STATUS: ICD-10-CM

## 2017-05-12 DIAGNOSIS — Z82.0 FAMILY HISTORY OF EPILEPSY AND OTHER DISEASES OF THE NERVOUS SYSTEM: ICD-10-CM

## 2017-05-15 ENCOUNTER — OTHER (OUTPATIENT)
Age: 47
End: 2017-05-15

## 2017-05-18 ENCOUNTER — APPOINTMENT (OUTPATIENT)
Dept: BARIATRICS | Facility: CLINIC | Age: 47
End: 2017-05-18

## 2017-05-18 VITALS
HEART RATE: 70 BPM | SYSTOLIC BLOOD PRESSURE: 104 MMHG | WEIGHT: 267.13 LBS | TEMPERATURE: 98.4 F | HEIGHT: 65 IN | OXYGEN SATURATION: 98 % | BODY MASS INDEX: 44.51 KG/M2 | DIASTOLIC BLOOD PRESSURE: 70 MMHG

## 2017-05-18 DIAGNOSIS — M25.569 PAIN IN UNSPECIFIED KNEE: ICD-10-CM

## 2017-05-18 DIAGNOSIS — I10 ESSENTIAL (PRIMARY) HYPERTENSION: ICD-10-CM

## 2017-05-18 DIAGNOSIS — E78.1 PURE HYPERGLYCERIDEMIA: ICD-10-CM

## 2017-06-08 ENCOUNTER — APPOINTMENT (OUTPATIENT)
Dept: BARIATRICS | Facility: CLINIC | Age: 47
End: 2017-06-08

## 2017-06-08 VITALS
HEART RATE: 69 BPM | SYSTOLIC BLOOD PRESSURE: 129 MMHG | WEIGHT: 257 LBS | BODY MASS INDEX: 42.82 KG/M2 | DIASTOLIC BLOOD PRESSURE: 84 MMHG | HEIGHT: 65 IN

## 2017-06-08 DIAGNOSIS — E66.01 MORBID (SEVERE) OBESITY DUE TO EXCESS CALORIES: ICD-10-CM

## 2017-06-08 DIAGNOSIS — E11.9 TYPE 2 DIABETES MELLITUS W/OUT COMPLICATIONS: ICD-10-CM

## 2017-06-08 DIAGNOSIS — E78.00 PURE HYPERCHOLESTEROLEMIA, UNSPECIFIED: ICD-10-CM

## 2017-09-14 ENCOUNTER — APPOINTMENT (OUTPATIENT)
Dept: BARIATRICS | Facility: CLINIC | Age: 47
End: 2017-09-14
Payer: MEDICAID

## 2017-09-14 VITALS
WEIGHT: 223 LBS | HEART RATE: 71 BPM | BODY MASS INDEX: 37.15 KG/M2 | SYSTOLIC BLOOD PRESSURE: 128 MMHG | DIASTOLIC BLOOD PRESSURE: 81 MMHG | HEIGHT: 65 IN

## 2017-09-14 DIAGNOSIS — K91.2 POSTSURGICAL MALABSORPTION, NOT ELSEWHERE CLASSIFIED: ICD-10-CM

## 2017-09-14 DIAGNOSIS — Z98.84 BARIATRIC SURGERY STATUS: ICD-10-CM

## 2017-09-14 LAB
25(OH)D3 SERPL-MCNC: 30.8 NG/ML
ALBUMIN SERPL ELPH-MCNC: 4 G/DL
ALP BLD-CCNC: 122 U/L
ALT SERPL-CCNC: 10 U/L
ANION GAP SERPL CALC-SCNC: 19 MMOL/L
AST SERPL-CCNC: 11 U/L
BASOPHILS # BLD AUTO: 0.01 K/UL
BASOPHILS NFR BLD AUTO: 0.1 %
BILIRUB SERPL-MCNC: 0.4 MG/DL
BUN SERPL-MCNC: 23 MG/DL
CA-I SERPL-SCNC: 1.26 MMOL/L
CALCIUM SERPL-MCNC: 9.1 MG/DL
CALCIUM SERPL-MCNC: 9.1 MG/DL
CHLORIDE SERPL-SCNC: 95 MMOL/L
CHOLEST SERPL-MCNC: 212 MG/DL
CHOLEST/HDLC SERPL: 3.6 RATIO
CO2 SERPL-SCNC: 21 MMOL/L
CREAT SERPL-MCNC: 1.28 MG/DL
EOSINOPHIL # BLD AUTO: 0.03 K/UL
EOSINOPHIL NFR BLD AUTO: 0.4 %
FOLATE SERPL-MCNC: >20 NG/ML
GLUCOSE SERPL-MCNC: 236 MG/DL
HBA1C MFR BLD HPLC: 8.2 %
HCT VFR BLD CALC: 39.1 %
HDLC SERPL-MCNC: 59 MG/DL
HGB BLD-MCNC: 12.5 G/DL
IMM GRANULOCYTES NFR BLD AUTO: 0.1 %
IRON SATN MFR SERPL: 41 %
IRON SERPL-MCNC: 111 UG/DL
LDLC SERPL CALC-MCNC: 104 MG/DL
LYMPHOCYTES # BLD AUTO: 1.71 K/UL
LYMPHOCYTES NFR BLD AUTO: 24.4 %
MAN DIFF?: NORMAL
MCHC RBC-ENTMCNC: 29.4 PG
MCHC RBC-ENTMCNC: 32 GM/DL
MCV RBC AUTO: 92 FL
MONOCYTES # BLD AUTO: 0.4 K/UL
MONOCYTES NFR BLD AUTO: 5.7 %
NEUTROPHILS # BLD AUTO: 4.85 K/UL
NEUTROPHILS NFR BLD AUTO: 69.3 %
PARATHYROID HORMONE INTACT: 69 PG/ML
PLATELET # BLD AUTO: 373 K/UL
POTASSIUM SERPL-SCNC: 4.7 MMOL/L
PREALB SERPL NEPH-MCNC: 25 MG/DL
PROT SERPL-MCNC: 7.4 G/DL
RBC # BLD: 4.25 M/UL
RBC # FLD: 15.6 %
SODIUM SERPL-SCNC: 135 MMOL/L
TIBC SERPL-MCNC: 268 UG/DL
TRIGL SERPL-MCNC: 245 MG/DL
TSH SERPL-ACNC: 1.31 UIU/ML
UIBC SERPL-MCNC: 157 UG/DL
VIT B12 SERPL-MCNC: 469 PG/ML
WBC # FLD AUTO: 7.01 K/UL

## 2017-09-14 PROCEDURE — 99213 OFFICE O/P EST LOW 20 MIN: CPT

## 2017-09-16 LAB — ZINC SERPL-MCNC: 76 UG/DL

## 2017-09-17 LAB
A-TOCOPHEROL VIT E SERPL-MCNC: 17.9 MG/L
BETA+GAMMA TOCOPHEROL SERPL-MCNC: <1 MG/L
VIT A SERPL-MCNC: 83 UG/DL
VIT B1 SERPL-MCNC: 123.2 NMOL/L

## 2017-09-19 ENCOUNTER — OTHER (OUTPATIENT)
Age: 47
End: 2017-09-19

## 2017-12-14 ENCOUNTER — APPOINTMENT (OUTPATIENT)
Dept: BARIATRICS | Facility: CLINIC | Age: 47
End: 2017-12-14

## 2017-12-18 PROCEDURE — 86901 BLOOD TYPING SEROLOGIC RH(D): CPT

## 2017-12-18 PROCEDURE — 84100 ASSAY OF PHOSPHORUS: CPT

## 2017-12-18 PROCEDURE — 86850 RBC ANTIBODY SCREEN: CPT

## 2017-12-18 PROCEDURE — 85027 COMPLETE CBC AUTOMATED: CPT

## 2017-12-18 PROCEDURE — 74241: CPT

## 2017-12-18 PROCEDURE — 36415 COLL VENOUS BLD VENIPUNCTURE: CPT

## 2017-12-18 PROCEDURE — 86900 BLOOD TYPING SEROLOGIC ABO: CPT

## 2017-12-18 PROCEDURE — 88307 TISSUE EXAM BY PATHOLOGIST: CPT

## 2017-12-18 PROCEDURE — 80048 BASIC METABOLIC PNL TOTAL CA: CPT

## 2017-12-18 PROCEDURE — 83036 HEMOGLOBIN GLYCOSYLATED A1C: CPT

## 2017-12-18 PROCEDURE — 83735 ASSAY OF MAGNESIUM: CPT

## 2018-01-03 ENCOUNTER — APPOINTMENT (OUTPATIENT)
Dept: BARIATRICS | Facility: CLINIC | Age: 48
End: 2018-01-03

## 2018-11-15 ENCOUNTER — MESSAGE (OUTPATIENT)
Age: 48
End: 2018-11-15

## 2019-12-19 ENCOUNTER — MESSAGE (OUTPATIENT)
Age: 49
End: 2019-12-19

## 2023-10-10 NOTE — PACU DISCHARGE NOTE - AIRWAY PATENCY:
H&P reviewed. The patient was examined and there are no changes to the H&P.    Vitals:    10/10/23 0630   BP: 140/92   Pulse: 85   Resp: 18   Temp: 97.2 øF (36.2 øC)   SpO2: 96%       Darian Carrillo MD  10/10/2023  07:47 EDT    Satisfactory